# Patient Record
Sex: FEMALE | Race: WHITE | NOT HISPANIC OR LATINO | URBAN - NONMETROPOLITAN AREA
[De-identification: names, ages, dates, MRNs, and addresses within clinical notes are randomized per-mention and may not be internally consistent; named-entity substitution may affect disease eponyms.]

---

## 2024-02-09 ENCOUNTER — OV NP (OUTPATIENT)
Dept: URBAN - NONMETROPOLITAN AREA CLINIC 4 | Facility: CLINIC | Age: 44
End: 2024-02-09

## 2024-02-09 VITALS
TEMPERATURE: 97.3 F | BODY MASS INDEX: 41.48 KG/M2 | DIASTOLIC BLOOD PRESSURE: 79 MMHG | HEIGHT: 64 IN | WEIGHT: 243 LBS | HEART RATE: 84 BPM | SYSTOLIC BLOOD PRESSURE: 149 MMHG

## 2024-02-12 ENCOUNTER — LAB (OUTPATIENT)
Dept: URBAN - NONMETROPOLITAN AREA CLINIC 4 | Facility: CLINIC | Age: 44
End: 2024-02-12

## 2024-02-12 ENCOUNTER — OV NP (OUTPATIENT)
Dept: URBAN - NONMETROPOLITAN AREA CLINIC 4 | Facility: CLINIC | Age: 44
End: 2024-02-12
Payer: COMMERCIAL

## 2024-02-12 VITALS
HEART RATE: 74 BPM | DIASTOLIC BLOOD PRESSURE: 95 MMHG | BODY MASS INDEX: 41.79 KG/M2 | HEIGHT: 64 IN | SYSTOLIC BLOOD PRESSURE: 156 MMHG | WEIGHT: 244.8 LBS | TEMPERATURE: 97.3 F

## 2024-02-12 DIAGNOSIS — K76.0 FATTY LIVER: ICD-10-CM

## 2024-02-12 DIAGNOSIS — R79.89 ELEVATED LFTS: ICD-10-CM

## 2024-02-12 DIAGNOSIS — Z78.9 ALCOHOL USE: ICD-10-CM

## 2024-02-12 PROBLEM — 197321007: Status: ACTIVE | Noted: 2024-02-12

## 2024-02-12 PROCEDURE — 99204 OFFICE O/P NEW MOD 45 MIN: CPT | Performed by: PHYSICIAN ASSISTANT

## 2024-02-12 PROCEDURE — 99244 OFF/OP CNSLTJ NEW/EST MOD 40: CPT | Performed by: PHYSICIAN ASSISTANT

## 2024-02-12 RX ORDER — LEVOTHYROXINE SODIUM 175 UG/1
1 CAPSULE IN THE MORNING ON AN EMPTY STOMACH CAPSULE ORAL ONCE A DAY
Status: ACTIVE | COMMUNITY

## 2024-02-12 RX ORDER — MONTELUKAST 10 MG/1
AS DIRECTED TABLET, FILM COATED ORAL
Status: ACTIVE | COMMUNITY

## 2024-02-12 RX ORDER — CHROMIUM 200 MCG
1 TABLET TABLET ORAL ONCE A DAY
Status: ACTIVE | COMMUNITY

## 2024-02-12 RX ORDER — FLUTICASONE PROPIONATE 50 UG/1
1 SPRAY IN EACH NOSTRIL SPRAY, METERED NASAL ONCE A DAY
Status: ACTIVE | COMMUNITY

## 2024-02-12 RX ORDER — VITAMIN K2 90 MCG
AS DIRECTED CAPSULE ORAL
Status: ACTIVE | COMMUNITY

## 2024-02-12 RX ORDER — LIOTHYRONINE SODIUM 5 UG/1
1 TABLET ON AN EMPTY STOMACH TABLET ORAL ONCE A DAY
Status: ACTIVE | COMMUNITY

## 2024-02-12 NOTE — HPI-TODAY'S VISIT:
Litzy is 43, has PMHx of allergies and hypothyroiism who presents today for referral from PCP for elevated LFTs and abnormal imaging of liver.  SHe denies any abd pain, nausea or other GI complaints  Noted to have AST and ALT of 80 each respectively as well as normal alk phos and tbili  RUQ us done shows heterogenous echotecture of liver with possible mild nodular contour.  Also mild prominence of CBD  No obvious masses noted.  Labs for secondary liver work up wth PCP with ??? mildly elevated A1AT, but patient also is obese and admits to drinking EtOH on weekends, larger amounts.

## 2024-03-05 ENCOUNTER — OV EP (OUTPATIENT)
Dept: URBAN - NONMETROPOLITAN AREA CLINIC 4 | Facility: CLINIC | Age: 44
End: 2024-03-05

## 2024-03-08 ENCOUNTER — LAB (OUTPATIENT)
Dept: URBAN - NONMETROPOLITAN AREA CLINIC 4 | Facility: CLINIC | Age: 44
End: 2024-03-08

## 2024-03-20 ENCOUNTER — OV EP (OUTPATIENT)
Dept: URBAN - NONMETROPOLITAN AREA CLINIC 4 | Facility: CLINIC | Age: 44
End: 2024-03-20
Payer: COMMERCIAL

## 2024-03-20 VITALS
TEMPERATURE: 98.4 F | DIASTOLIC BLOOD PRESSURE: 90 MMHG | HEIGHT: 64 IN | SYSTOLIC BLOOD PRESSURE: 152 MMHG | HEART RATE: 84 BPM | WEIGHT: 245.8 LBS | BODY MASS INDEX: 41.96 KG/M2

## 2024-03-20 DIAGNOSIS — Z78.9 ALCOHOL USE: ICD-10-CM

## 2024-03-20 DIAGNOSIS — K76.0 FATTY LIVER: ICD-10-CM

## 2024-03-20 DIAGNOSIS — R79.89 ELEVATED LFTS: ICD-10-CM

## 2024-03-20 PROCEDURE — 99214 OFFICE O/P EST MOD 30 MIN: CPT | Performed by: PHYSICIAN ASSISTANT

## 2024-03-20 RX ORDER — VITAMIN K2 90 MCG
AS DIRECTED CAPSULE ORAL
Status: ACTIVE | COMMUNITY

## 2024-03-20 RX ORDER — FLUTICASONE PROPIONATE 50 UG/1
1 SPRAY IN EACH NOSTRIL SPRAY, METERED NASAL ONCE A DAY
Status: ACTIVE | COMMUNITY

## 2024-03-20 RX ORDER — LIOTHYRONINE SODIUM 5 UG/1
1 TABLET ON AN EMPTY STOMACH TABLET ORAL ONCE A DAY
Status: ACTIVE | COMMUNITY

## 2024-03-20 RX ORDER — CHROMIUM 200 MCG
1 TABLET TABLET ORAL ONCE A DAY
Status: ACTIVE | COMMUNITY

## 2024-03-20 RX ORDER — LEVOTHYROXINE SODIUM 175 UG/1
1 CAPSULE IN THE MORNING ON AN EMPTY STOMACH CAPSULE ORAL ONCE A DAY
Status: ACTIVE | COMMUNITY

## 2024-03-20 RX ORDER — MONTELUKAST 10 MG/1
AS DIRECTED TABLET, FILM COATED ORAL
Status: ACTIVE | COMMUNITY

## 2024-03-20 NOTE — HPI-TODAY'S VISIT:
Litzy is 43, has PMHx of allergies and hypothyroiism who presents today for referral from PCP for elevated LFTs and abnormal imaging of liver.  SHe denies any abd pain, nausea or other GI complaints  Noted to have AST and ALT of 80 each respectively as well as normal alk phos and tbili  RUQ us done shows heterogenous echotecture of liver with possible mild nodular contour.  Also mild prominence of CBD  No obvious masses noted.  Labs for secondary liver work up wth PCP with ??? mildly elevated A1AT, but patient also is obese and admits to drinking EtOH on weekends, larger amounts.  3.20.24 has been sober for 32 days no complaints no jaundice secondary liver work up non revealing  Imaging with no marshall dil, no evidence of cirrhosis but does have severe fatty liver Fibroscan pending

## 2024-03-21 LAB
ALBUMIN/GLOBULIN RATIO: 1.8
ALBUMIN: 4.2
ALKALINE PHOSPHATASE: 82
ALT (SGPT): 42
AST (SGOT): 31
BILIRUBIN, DIRECT: 0.1
BILIRUBIN, INDIRECT: 0.3
BILIRUBIN, TOTAL: 0.4
GLOBULIN: 2.3
PROTEIN, TOTAL: 6.5

## 2024-09-17 ENCOUNTER — OFFICE VISIT (OUTPATIENT)
Dept: URBAN - NONMETROPOLITAN AREA CLINIC 4 | Facility: CLINIC | Age: 44
End: 2024-09-17

## 2024-09-25 ENCOUNTER — OFFICE VISIT (OUTPATIENT)
Dept: URBAN - NONMETROPOLITAN AREA CLINIC 4 | Facility: CLINIC | Age: 44
End: 2024-09-25

## 2024-10-08 ENCOUNTER — OFFICE VISIT (OUTPATIENT)
Dept: URBAN - NONMETROPOLITAN AREA CLINIC 4 | Facility: CLINIC | Age: 44
End: 2024-10-08

## 2024-10-08 RX ORDER — VITAMIN K2 90 MCG
AS DIRECTED CAPSULE ORAL
Status: ACTIVE | COMMUNITY

## 2024-10-08 RX ORDER — CHROMIUM 200 MCG
1 TABLET TABLET ORAL ONCE A DAY
Status: ACTIVE | COMMUNITY

## 2024-10-08 RX ORDER — MONTELUKAST 10 MG/1
AS DIRECTED TABLET, FILM COATED ORAL
Status: ACTIVE | COMMUNITY

## 2024-10-08 RX ORDER — LIOTHYRONINE SODIUM 5 UG/1
1 TABLET ON AN EMPTY STOMACH TABLET ORAL ONCE A DAY
Status: ACTIVE | COMMUNITY

## 2024-10-08 RX ORDER — LEVOTHYROXINE SODIUM 175 UG/1
1 CAPSULE IN THE MORNING ON AN EMPTY STOMACH CAPSULE ORAL ONCE A DAY
Status: ACTIVE | COMMUNITY

## 2024-10-08 RX ORDER — FLUTICASONE PROPIONATE 50 UG/1
1 SPRAY IN EACH NOSTRIL SPRAY, METERED NASAL ONCE A DAY
Status: ACTIVE | COMMUNITY

## 2024-10-15 ENCOUNTER — OFFICE VISIT (OUTPATIENT)
Dept: URBAN - NONMETROPOLITAN AREA CLINIC 4 | Facility: CLINIC | Age: 44
End: 2024-10-15
Payer: COMMERCIAL

## 2024-10-15 ENCOUNTER — DASHBOARD ENCOUNTERS (OUTPATIENT)
Age: 44
End: 2024-10-15

## 2024-10-15 VITALS
SYSTOLIC BLOOD PRESSURE: 170 MMHG | HEART RATE: 86 BPM | HEIGHT: 64 IN | WEIGHT: 239 LBS | DIASTOLIC BLOOD PRESSURE: 100 MMHG | TEMPERATURE: 98 F | BODY MASS INDEX: 40.8 KG/M2

## 2024-10-15 DIAGNOSIS — K64.9 HEMORRHOIDS, UNSPECIFIED HEMORRHOID TYPE: ICD-10-CM

## 2024-10-15 DIAGNOSIS — E66.01 MORBID OBESITY: ICD-10-CM

## 2024-10-15 DIAGNOSIS — R79.89 ELEVATED LFTS: ICD-10-CM

## 2024-10-15 DIAGNOSIS — K76.0 FATTY LIVER: ICD-10-CM

## 2024-10-15 DIAGNOSIS — Z78.9 ALCOHOL USE: ICD-10-CM

## 2024-10-15 PROBLEM — 197321007: Status: ACTIVE | Noted: 2024-10-15

## 2024-10-15 PROBLEM — 238136002: Status: ACTIVE | Noted: 2024-10-15

## 2024-10-15 PROCEDURE — 99214 OFFICE O/P EST MOD 30 MIN: CPT | Performed by: PHYSICIAN ASSISTANT

## 2024-10-15 RX ORDER — MONTELUKAST 10 MG/1
AS DIRECTED TABLET, FILM COATED ORAL
Status: ACTIVE | COMMUNITY

## 2024-10-15 RX ORDER — LIOTHYRONINE SODIUM 5 UG/1
1 TABLET ON AN EMPTY STOMACH TABLET ORAL ONCE A DAY
Status: ACTIVE | COMMUNITY

## 2024-10-15 RX ORDER — FLUTICASONE PROPIONATE 50 UG/1
1 SPRAY IN EACH NOSTRIL SPRAY, METERED NASAL ONCE A DAY
Status: ACTIVE | COMMUNITY

## 2024-10-15 RX ORDER — VITAMIN K2 90 MCG
AS DIRECTED CAPSULE ORAL
Status: ACTIVE | COMMUNITY

## 2024-10-15 RX ORDER — CHROMIUM 200 MCG
1 TABLET TABLET ORAL ONCE A DAY
Status: ACTIVE | COMMUNITY

## 2024-10-15 RX ORDER — LEVOTHYROXINE SODIUM 175 UG/1
1 CAPSULE IN THE MORNING ON AN EMPTY STOMACH CAPSULE ORAL ONCE A DAY
Status: ACTIVE | COMMUNITY

## 2024-10-15 NOTE — HPI-TODAY'S VISIT:
Litzy is 43, has PMHx of allergies and hypothyroiism who presents today for referral from PCP for elevated LFTs and abnormal imaging of liver.  SHe denies any abd pain, nausea or other GI complaints  Noted to have AST and ALT of 80 each respectively as well as normal alk phos and tbili  RUQ us done shows heterogenous echotecture of liver with possible mild nodular contour.  Also mild prominence of CBD  No obvious masses noted.  Labs for secondary liver work up wth PCP with ??? mildly elevated A1AT, but patient also is obese and admits to drinking EtOH on weekends, larger amounts.  3.20.24 has been sober for 32 days no complaints no jaundice secondary liver work up non revealing  Imaging with no marshall dil, no evidence of cirrhosis but does have severe fatty liver Fibroscan pending  10.15.24 doing well, no complaints has not been drinking.  does complain of hemorrhoids.  Fibroscan with S3, F0/F1

## 2024-10-16 LAB
ALBUMIN/GLOBULIN RATIO: 1.7
ALBUMIN: 4.1
ALKALINE PHOSPHATASE: 81
ALT: 48
AST: 34
BILIRUBIN, DIRECT: 0.1
BILIRUBIN, INDIRECT: 0.6
BILIRUBIN, TOTAL: 0.7
FIB 4 INDEX: 0.66
FIB 4 INTERPRETATION: (no result)
GLOBULIN: 2.4
PLATELET COUNT: 319
PROTEIN, TOTAL: 6.5

## 2025-03-04 NOTE — PHYSICAL EXAM PSYCH:
normal mood with appropriate affect, cognitive function intact, speech clear [As Noted in HPI] : as noted in HPI [Hoarseness] : hoarseness [Throat Clearing] : no throat clearing [Throat Dryness] : no throat dryness [Negative] : Heme/Lymph